# Patient Record
Sex: MALE | Race: WHITE
[De-identification: names, ages, dates, MRNs, and addresses within clinical notes are randomized per-mention and may not be internally consistent; named-entity substitution may affect disease eponyms.]

---

## 2017-06-08 ENCOUNTER — HOSPITAL ENCOUNTER (OUTPATIENT)
Dept: HOSPITAL 80 - CIMAGING | Age: 72
End: 2017-06-08
Attending: INTERNAL MEDICINE
Payer: COMMERCIAL

## 2017-06-08 DIAGNOSIS — M19.071: Primary | ICD-10-CM

## 2017-06-08 DIAGNOSIS — M77.31: ICD-10-CM

## 2017-10-01 ENCOUNTER — HOSPITAL ENCOUNTER (OUTPATIENT)
Dept: HOSPITAL 80 - FCPNEURO | Age: 72
End: 2017-10-01
Attending: PSYCHIATRY & NEUROLOGY
Payer: COMMERCIAL

## 2017-10-01 DIAGNOSIS — G25.81: ICD-10-CM

## 2017-10-01 DIAGNOSIS — G47.37: ICD-10-CM

## 2017-10-01 DIAGNOSIS — G47.33: Primary | ICD-10-CM

## 2018-03-15 ENCOUNTER — HOSPITAL ENCOUNTER (EMERGENCY)
Dept: HOSPITAL 80 - CED | Age: 73
Discharge: HOME | End: 2018-03-15
Payer: COMMERCIAL

## 2018-03-15 VITALS
TEMPERATURE: 98 F | SYSTOLIC BLOOD PRESSURE: 142 MMHG | HEART RATE: 88 BPM | OXYGEN SATURATION: 96 % | DIASTOLIC BLOOD PRESSURE: 88 MMHG | RESPIRATION RATE: 20 BRPM

## 2018-03-15 DIAGNOSIS — S61.011A: Primary | ICD-10-CM
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
## 2018-03-15 DIAGNOSIS — Z79.84: ICD-10-CM

## 2018-03-15 DIAGNOSIS — Z79.82: ICD-10-CM

## 2018-03-15 NOTE — EDPHY
H & P


Time Seen by Provider: 03/15/18 09:38


HPI/ROS: 





CHIEF COMPLAINT:  Thumb laceration





HISTORY OF PRESENT ILLNESS:  72-year-old gentleman lacerated the tip of his 

right thumb with garden lilliana 24 hr ago.  Patient has a oval flap-like 

laceration.  Bleeding has been able to be controlled with pressure.  Minimal 

pain.  Patient believes his tetanus is up-to-date.





REVIEW OF SYSTEMS:


Aside from elements discussed in the HPI, a comprehensive 10-point review of 

systems was reviewed and is negative.





PAST MEDICAL HISTORY:  Pre diabetes.





SOCIAL HISTORY:  Retired.  Nonsmoker.





GENERAL APPEARANCE:  Pleasant, alert, oriented.


FOCUSED EXAM OF right hand:  Normal pulses at the radius and ulna.  Right thumb

:  Curvilinear flap-like laceration over the distal pad.  Does not involve the 

nail.  Some dusky appearance to the flap.  Bleeding controlled.  Normal two-

point sensation.


Neurovascular exam:  Good capillary refill, normal motor exam, normal 

neurologic exam.


Smoking Status: Former smoker


Constitutional: 





 Initial Vital Signs











Temperature (C)  36.6 C   03/15/18 09:41


 


Heart Rate  88   03/15/18 09:41


 


Respiratory Rate  20   03/15/18 09:41


 


Blood Pressure  142/88 H  03/15/18 09:41


 


O2 Sat (%)  96   03/15/18 09:41








 











O2 Delivery Mode               Room Air














Allergies/Adverse Reactions: 


 





codeine Allergy (Verified 11/28/16 11:17)


 


Penicillins Allergy (Verified 11/28/16 11:17)


 








Home Medications: 














 Medication  Instructions  Recorded


 


Aspirin 81mg (*) 81 mg PO DAILY 11/28/16


 


CeleXA 20 MG 20 mg PO DAILY 11/28/16


 


Fish Oil 1,000 mg Capsule 1,000 mg PO EVERY OTHER DAY 11/28/16


 


Lisinopril/Hctz 20/12.5MG 1 PO DAILY 11/28/16


 


Metformin HCl  mg PO DAILY 11/28/16


 


Multivitamin 1 PO DAILY 11/28/16


 


Pravastatin Sodium 20 mg PO DAILY 11/28/16














MDM/Departure





- [facility]


ED Course/Re-evaluation: 





Patient I discussed various options.  We will not suture as it has been 

prolonged period of time.  Bleeding is well controlled.  Laceration was cleaned 

and the flap was elevated.  Following this, Steri-Strips were used to 

approximate the edge.  Tube gauze dressing was applied.


Differential Diagnosis: 





Differential diagnosis for the patient's injury was considered including but 

not limited to contusion, abrasion, laceration, fracture, open fracture, or 

dislocation.





- Depart


Disposition: Home, Routine, Self-Care


Clinical Impression: 


 Laceration





Condition: Good


Instructions:  Finger Laceration (ED)


Additional Instructions: 


Keep wound clean and dry.





Keep the tube gauze dressing in place for the next 24 hr.





After that, you may use a Band-Aid to cover the laceration.





You may clean along the the laceration  (between the Steri-Strips) with mixture 

of hydrogen peroxide and water.  





Apply a thin layer of antibiotic cream. 





No soaking wound in water.  Showers are ok.





Watch for signs of infection.





Use Tylenol or ibuprofen as needed for discomfort.





Return to emergency department if any concerns regarding infection.








Referrals: 


Nick Lockwood MD [Primary Care Provider] - As per Instructions